# Patient Record
Sex: MALE | URBAN - METROPOLITAN AREA
[De-identification: names, ages, dates, MRNs, and addresses within clinical notes are randomized per-mention and may not be internally consistent; named-entity substitution may affect disease eponyms.]

---

## 2022-10-04 ENCOUNTER — OFFICE VISIT (OUTPATIENT)
Dept: NEUROLOGY | Age: 57
End: 2022-10-04
Payer: COMMERCIAL

## 2022-10-04 VITALS
BODY MASS INDEX: 29.82 KG/M2 | DIASTOLIC BLOOD PRESSURE: 74 MMHG | SYSTOLIC BLOOD PRESSURE: 120 MMHG | HEIGHT: 67 IN | OXYGEN SATURATION: 98 % | WEIGHT: 190 LBS | HEART RATE: 68 BPM

## 2022-10-04 DIAGNOSIS — R25.1 EXCESSIVE PHYSIOLOGIC TREMOR: Primary | ICD-10-CM

## 2022-10-04 PROCEDURE — 99204 OFFICE O/P NEW MOD 45 MIN: CPT | Performed by: PSYCHIATRY & NEUROLOGY

## 2022-10-04 NOTE — PROGRESS NOTES
NEUROLOGY  NEW PATIENT EVALUATION/CONSULTATION       PATIENT NAME: Savana Cueto    MRN: [id number]    REASON FOR CONSULTATION: Tremor    10/04/22      Previous records (physician notes, laboratory reports, and radiology reports) and imaging studies were reviewed and summarized. My recommendations will be communicated back to the patient's physician(s) via electronic medical record and/or by [facility] mail. HISTORY OF PRESENT ILLNESS:  Savana Cueto is a 64 y.o. right handed male presenting for evaluation of right hand tremors. He reports onset of tremor approximately 6 months ago, stable to improved since onset. He awakens with tremors typically in the evenings which may occur at rest or with activity. He has noted subtle tremor with activity involving the RUE when target shooting or with hands outstretched. He admits to drinking several glasses of tea daily. Motor:   Tremor-RUE tremors mainly in the evenings when sleeping  Walking/Falls- denies falls  Micrographia- denies  Freezing/Bradykinesia- denies  Balance- denies imbalance  Non-motor:   Drooling-no  Dysphagia-no  Hypophonia-no  Anosmia-yes  Autonomic:  Urinary-frequency  Constipation-no issues  Orthostatic hypotension-none  Sleep-no RSBD or other sleep disorders  Cognitive/Memory- denies issues  Behavioral/Psychiatric:   Hallucinations- none  Depression- denies    The patient denies a history of exposure to neuroleptics, (Reglan, Phenergan, Compazine, no encephalitis/meningitis, no significant exposure to insecticides/ pesticides/ heavy metals/ carbon monoxide. +FHx mother with PD      Previous evaluations: None      PAST MEDICAL HISTORY:  History reviewed. No pertinent past medical history. PAST SURGICAL HISTORY:  History reviewed. No pertinent surgical history.     FAMILY HISTORY:   Mother-Parkinson's disease      SOCIAL HISTORY:  Social History     Tobacco Use    Smoking status: Never    Smokeless tobacco: Never   Vaping Use    Vaping Use: Never used   Substance and Sexual Activity    Alcohol use: Never    Drug use: Never         MEDICATIONS:   None      ALLERGIES:  No Known Allergies      REVIEW OF SYSTEMS:  10 point ROS reviewed with patient. Please see scanned document under media. PHYSICAL EXAM:  Vital Signs: Visit Vitals  /74   Pulse 68   Ht 5' 7\" (1.702 m)   Wt 190 lb (86.2 kg)   SpO2 98%   BMI 29.76 kg/m²        General Medical Exam:  General:  Well appearing, comfortable, in no apparent distress. Eyes/ENT: see cranial nerve examination. Neck: No masses appreciated. Full range of motion without tenderness. Respiratory:  Clear to auscultation, good air entry bilaterally. Cardiac:  Regular rate and rhythm, no murmur. GI:  Soft, non-tender, non-distended abdomen. Bowel sounds normal. No masses, organomegaly. Extremities:  No deformities, edema, or skin discoloration. Skin:  No rashes or lesions. Neurological:  Mental Status:  Alert and oriented to person, place, and time with fluent speech. Cranial Nerves:   CNII/III/IV/VI: visual fields full to confrontation, EOMI, PERRL, no ptosis or nystagmus. CN V: Facial sensation intact bilaterally, masseter 5/5   CN VII: Facial muscles symmetric and strong   CN VIII: Hears finger rub well bilaterally, intact vestibular function   CN IX/X: Normal palatal movement   CN XI: Full strength shoulder shrug bilaterally   CN XII: Tongue protrusion full and midline without fasciculation or atrophy  Motor: Normal tone and muscle bulk with no pronator drift. No atrophy or fasciculations present on examination.   Individual muscle group testing:  Shoulder abduction:   Left:5/5   Right : 5/5    Shoulder adduction:   Left:5/5   Right : 5/5    Elbow flexion:      Left:5/5   Right : 5/5  Elbow extension:    Left:5/5   Right : 5/5   Wrist flexion:    Left:5/5   Right : 5/5  Wrist extension:    Left:5/5   Right : 5/5  Arm pronation:   Left:5/5   Right : 5/5  Arm supination:   Left:5/5   Right : 5/5 Finger flexion:    Left:5/5   Right : 5/5    Finger extension:   Left:5/5   Right : 5/5   Finger abduction:  Left:5/5   Right : 5/5   Finger adduction:   Left:5/5   Right : 5/5  Hip flexion:     Left:5/5   Right : 5/5         Hip extension:   Left:5/5   Right : 5/5    Knee flexion:    Left:5/5   Right : 5/5    Knee extension:   Left:5/5   Right : 5/5    Dorsiflexion:     Left:5/5   Right : 5/5  Plantar flexion:    Left:5/5   Right : 5/5    Foot inversion:    Left:5/5   Right : 5/5   Foot eversion:    Left:5/5   Right : 5/5  Toe flexion:      Left:5/5   Right : 5/5          Toe extension:    Left:5/5   Right : 5/5    MSRs: No crossed adductors or clonus. RIGHT  LEFT   Brachioradialis 1+ 1+   Biceps 1+ 1+   Triceps 1+ 1+   Knee 1+ 1+   Achilles 1+ 1+        Plantar response Downward Downward          Sensation: Normal and symmetric perception of temperature, light touch, proprioception, and vibration  Coordination: No dysmetria. Normal rapid alternating movements; finger-to-nose and heel-to- shin testing are within normal limits. No bradykinesia, rigidity. +Subtle RUE postural tremor without associated resting/action tremor. Gait: Normal native gait. ASSESSMENT:      ICD-10-CM ICD-9-CM    1. Excessive physiologic tremor  R25.1 333.1 TSH 3RD GENERATION      64year old male referred for evaluation of right hand tremors x 6 months with attenuation since onset with evidence of subtle right hand postural tremor on examination. No associated bradykinesia or rigidity to suggest an underlying neurodegenerative process such as Parkinson's disease. He admits to excessive caffeine intake daily, and we discussed the importance of limiting stimulants to avoid excessive physiologic tremor. Will also check thyroid levels to exclude any metabolic derangements which may present similarly. He was advised to return for repeat clinical evaluation if any new/worsening symptoms.      PLAN:  TSH level  Discussed caffeine cessation       Follow-up and Dispositions    Return if symptoms worsen or fail to improve. I have discussed the diagnosis with the patient today and the intended plan as seen in the above orders with both the patient as well as referring provider and/or PCP via electronic correspondence. The patient has received an after-visit summary and questions were answered concerning future plans. Christo Marcelino DO  Staff Neurologist  Diplomate, [employer] Board of Psychiatry & Neurology       CC Referring provider:  Sirisha Peralta DO

## 2022-10-04 NOTE — LETTER
10/4/2022 2:05 PM    Patient:  Mary Camp   YOB: 1965  Date of Visit: 10/4/2022      Dear Braxton Urias, [address] [place] [phone]  Via Fax: [phone]: Thank you for referring Mr. Mary Camp to me for evaluation/treatment. Below are the relevant portions of my assessment and plan of care. If you have questions, please do not hesitate to call me. I look forward to following Kisha Dunn [place] along with you.         Sincerely,      Feliciano Mcgee, DO

## 2022-10-05 LAB — TSH SERPL DL<=0.005 MIU/L-ACNC: 2.89 UIU/ML (ref 0.45–4.5)

## 2022-10-06 ENCOUNTER — TELEPHONE (OUTPATIENT)
Dept: NEUROLOGY | Age: 57
End: 2022-10-06

## 2022-10-06 NOTE — TELEPHONE ENCOUNTER
Called and spoke to the Pt.  Per Dr. Cher Robles:  Dk Augustin reviewed without significant abnormalities